# Patient Record
Sex: FEMALE | Race: WHITE | NOT HISPANIC OR LATINO | URBAN - METROPOLITAN AREA
[De-identification: names, ages, dates, MRNs, and addresses within clinical notes are randomized per-mention and may not be internally consistent; named-entity substitution may affect disease eponyms.]

---

## 2017-12-14 ENCOUNTER — RECORDS - HEALTHEAST (OUTPATIENT)
Dept: LAB | Facility: CLINIC | Age: 47
End: 2017-12-14

## 2017-12-14 LAB
CHOLEST SERPL-MCNC: 206 MG/DL
FASTING STATUS PATIENT QL REPORTED: ABNORMAL
HDLC SERPL-MCNC: 81 MG/DL
LDLC SERPL CALC-MCNC: 115 MG/DL
TRIGL SERPL-MCNC: 52 MG/DL

## 2019-01-23 ENCOUNTER — AMBULATORY - HEALTHEAST (OUTPATIENT)
Dept: NEUROLOGY | Facility: CLINIC | Age: 49
End: 2019-01-23

## 2019-01-23 DIAGNOSIS — S06.0XAA CONCUSSION: ICD-10-CM

## 2019-01-28 ENCOUNTER — COMMUNICATION - HEALTHEAST (OUTPATIENT)
Dept: NEUROLOGY | Facility: CLINIC | Age: 49
End: 2019-01-28

## 2019-02-12 ENCOUNTER — HOSPITAL ENCOUNTER (OUTPATIENT)
Dept: NEUROLOGY | Facility: CLINIC | Age: 49
Setting detail: THERAPIES SERIES
Discharge: STILL ADMITTED OR ADMINISTRATIVE | End: 2019-02-12
Attending: NURSE PRACTITIONER

## 2019-02-12 DIAGNOSIS — F06.4 ANXIETY DISORDER DUE TO MEDICAL CONDITION: ICD-10-CM

## 2019-02-12 DIAGNOSIS — R41.3 MEMORY LOSS: ICD-10-CM

## 2019-02-12 DIAGNOSIS — R53.83 FATIGUE, UNSPECIFIED TYPE: ICD-10-CM

## 2019-02-12 DIAGNOSIS — F07.81 POST CONCUSSION SYNDROME: ICD-10-CM

## 2019-02-12 LAB
ALBUMIN SERPL-MCNC: 4.3 G/DL (ref 3.5–5)
ALP SERPL-CCNC: 57 U/L (ref 45–120)
ALT SERPL W P-5'-P-CCNC: 12 U/L (ref 0–45)
ANION GAP SERPL CALCULATED.3IONS-SCNC: 12 MMOL/L (ref 5–18)
AST SERPL W P-5'-P-CCNC: 15 U/L (ref 0–40)
BASOPHILS # BLD AUTO: 0.1 THOU/UL (ref 0–0.2)
BASOPHILS NFR BLD AUTO: 1 % (ref 0–2)
BILIRUB SERPL-MCNC: 0.3 MG/DL (ref 0–1)
BUN SERPL-MCNC: 18 MG/DL (ref 8–22)
CALCIUM SERPL-MCNC: 10.1 MG/DL (ref 8.5–10.5)
CHLORIDE BLD-SCNC: 103 MMOL/L (ref 98–107)
CO2 SERPL-SCNC: 24 MMOL/L (ref 22–31)
CREAT SERPL-MCNC: 0.82 MG/DL (ref 0.6–1.1)
CRP SERPL HS-MCNC: 2.1 MG/L (ref 0–3)
EOSINOPHIL # BLD AUTO: 0.2 THOU/UL (ref 0–0.4)
EOSINOPHIL NFR BLD AUTO: 4 % (ref 0–6)
ERYTHROCYTE [DISTWIDTH] IN BLOOD BY AUTOMATED COUNT: 12.2 % (ref 11–14.5)
ERYTHROCYTE [SEDIMENTATION RATE] IN BLOOD BY WESTERGREN METHOD: 19 MM/HR (ref 0–20)
FOLATE SERPL-MCNC: 9.8 NG/ML
GFR SERPL CREATININE-BSD FRML MDRD: >60 ML/MIN/1.73M2
GLUCOSE BLD-MCNC: 80 MG/DL (ref 70–125)
HCT VFR BLD AUTO: 43.6 % (ref 35–47)
HGB BLD-MCNC: 14.6 G/DL (ref 12–16)
LYMPHOCYTES # BLD AUTO: 1.6 THOU/UL (ref 0.8–4.4)
LYMPHOCYTES NFR BLD AUTO: 27 % (ref 20–40)
MAGNESIUM SERPL-MCNC: 2.8 MG/DL (ref 1.8–2.6)
MCH RBC QN AUTO: 32.7 PG (ref 27–34)
MCHC RBC AUTO-ENTMCNC: 33.5 G/DL (ref 32–36)
MCV RBC AUTO: 98 FL (ref 80–100)
MONOCYTES # BLD AUTO: 0.4 THOU/UL (ref 0–0.9)
MONOCYTES NFR BLD AUTO: 8 % (ref 2–10)
NEUTROPHILS # BLD AUTO: 3.6 THOU/UL (ref 2–7.7)
NEUTROPHILS NFR BLD AUTO: 61 % (ref 50–70)
PLATELET # BLD AUTO: 253 THOU/UL (ref 140–440)
PMV BLD AUTO: 11.1 FL (ref 8.5–12.5)
POTASSIUM BLD-SCNC: 4 MMOL/L (ref 3.5–5)
PROT SERPL-MCNC: 8 G/DL (ref 6–8)
RBC # BLD AUTO: 4.46 MILL/UL (ref 3.8–5.4)
SODIUM SERPL-SCNC: 139 MMOL/L (ref 136–145)
TSH SERPL DL<=0.005 MIU/L-ACNC: 0.55 UIU/ML (ref 0.3–5)
VIT B12 SERPL-MCNC: 349 PG/ML (ref 213–816)
WBC: 5.9 THOU/UL (ref 4–11)

## 2019-02-13 ENCOUNTER — HOSPITAL ENCOUNTER (OUTPATIENT)
Dept: NEUROLOGY | Facility: CLINIC | Age: 49
Setting detail: THERAPIES SERIES
Discharge: STILL ADMITTED OR ADMINISTRATIVE | End: 2019-02-13
Attending: NURSE PRACTITIONER

## 2019-02-13 DIAGNOSIS — F43.23 ADJUSTMENT DISORDER WITH MIXED ANXIETY AND DEPRESSED MOOD: ICD-10-CM

## 2019-02-13 DIAGNOSIS — F07.81 POST CONCUSSION SYNDROME: ICD-10-CM

## 2019-02-13 LAB — 25(OH)D3 SERPL-MCNC: 30.8 NG/ML (ref 30–80)

## 2019-02-19 ENCOUNTER — HOSPITAL ENCOUNTER (OUTPATIENT)
Dept: OCCUPATIONAL THERAPY | Age: 49
Setting detail: THERAPIES SERIES
Discharge: STILL ADMITTED OR ADMINISTRATIVE | End: 2019-02-19
Attending: NURSE PRACTITIONER

## 2019-02-19 ENCOUNTER — HOSPITAL ENCOUNTER (OUTPATIENT)
Dept: PHYSICAL THERAPY | Age: 49
Setting detail: THERAPIES SERIES
Discharge: STILL ADMITTED OR ADMINISTRATIVE | End: 2019-02-19
Attending: NURSE PRACTITIONER

## 2019-02-19 DIAGNOSIS — R26.89 UNSTABLE BALANCE: ICD-10-CM

## 2019-02-19 DIAGNOSIS — M54.2 NECK PAIN: ICD-10-CM

## 2019-02-19 DIAGNOSIS — H53.9 VISION ABNORMALITIES: ICD-10-CM

## 2019-02-19 DIAGNOSIS — F07.81 POST CONCUSSION SYNDROME: ICD-10-CM

## 2019-02-21 ENCOUNTER — HOSPITAL ENCOUNTER (OUTPATIENT)
Dept: NEUROLOGY | Facility: CLINIC | Age: 49
Setting detail: THERAPIES SERIES
Discharge: STILL ADMITTED OR ADMINISTRATIVE | End: 2019-02-21
Attending: PSYCHOLOGIST

## 2019-02-21 DIAGNOSIS — F43.23 ADJUSTMENT DISORDER WITH MIXED ANXIETY AND DEPRESSED MOOD: ICD-10-CM

## 2019-02-26 ENCOUNTER — HOSPITAL ENCOUNTER (OUTPATIENT)
Dept: PHYSICAL THERAPY | Age: 49
Setting detail: THERAPIES SERIES
Discharge: STILL ADMITTED OR ADMINISTRATIVE | End: 2019-02-26
Attending: PHYSICAL THERAPIST

## 2019-02-26 DIAGNOSIS — R26.89 UNSTABLE BALANCE: ICD-10-CM

## 2019-02-26 DIAGNOSIS — M54.2 NECK PAIN: ICD-10-CM

## 2019-03-12 ENCOUNTER — HOSPITAL ENCOUNTER (OUTPATIENT)
Dept: PHYSICAL THERAPY | Age: 49
Setting detail: THERAPIES SERIES
Discharge: STILL ADMITTED OR ADMINISTRATIVE | End: 2019-03-12
Attending: PHYSICAL THERAPIST

## 2019-03-12 ENCOUNTER — HOSPITAL ENCOUNTER (OUTPATIENT)
Dept: NEUROLOGY | Facility: CLINIC | Age: 49
Setting detail: THERAPIES SERIES
Discharge: STILL ADMITTED OR ADMINISTRATIVE | End: 2019-03-12
Attending: NURSE PRACTITIONER

## 2019-03-12 DIAGNOSIS — G44.309 POST-CONCUSSION HEADACHE: ICD-10-CM

## 2019-03-12 DIAGNOSIS — F43.23 ADJUSTMENT DISORDER WITH MIXED ANXIETY AND DEPRESSED MOOD: ICD-10-CM

## 2019-03-12 DIAGNOSIS — R26.89 UNSTABLE BALANCE: ICD-10-CM

## 2019-03-12 DIAGNOSIS — F07.81 POST CONCUSSION SYNDROME: ICD-10-CM

## 2019-03-12 DIAGNOSIS — R79.89 LOW VITAMIN B12 LEVEL: ICD-10-CM

## 2019-03-12 DIAGNOSIS — F06.4 ANXIETY DISORDER DUE TO MEDICAL CONDITION: ICD-10-CM

## 2019-03-12 DIAGNOSIS — Z76.89 RETURN TO WORK EVALUATION: ICD-10-CM

## 2019-03-12 DIAGNOSIS — M54.2 NECK PAIN: ICD-10-CM

## 2019-03-19 ENCOUNTER — HOSPITAL ENCOUNTER (OUTPATIENT)
Dept: PHYSICAL THERAPY | Age: 49
Setting detail: THERAPIES SERIES
Discharge: STILL ADMITTED OR ADMINISTRATIVE | End: 2019-03-19
Attending: PHYSICAL THERAPIST

## 2019-03-19 DIAGNOSIS — M54.2 NECK PAIN: ICD-10-CM

## 2019-03-19 DIAGNOSIS — R26.89 UNSTABLE BALANCE: ICD-10-CM

## 2019-04-02 ENCOUNTER — HOSPITAL ENCOUNTER (OUTPATIENT)
Dept: PHYSICAL THERAPY | Age: 49
Setting detail: THERAPIES SERIES
Discharge: STILL ADMITTED OR ADMINISTRATIVE | End: 2019-04-02
Attending: PHYSICAL THERAPIST

## 2019-04-02 DIAGNOSIS — M54.2 NECK PAIN: ICD-10-CM

## 2019-04-02 DIAGNOSIS — R26.89 UNSTABLE BALANCE: ICD-10-CM

## 2019-04-09 ENCOUNTER — HOSPITAL ENCOUNTER (OUTPATIENT)
Dept: PHYSICAL THERAPY | Age: 49
Setting detail: THERAPIES SERIES
Discharge: STILL ADMITTED OR ADMINISTRATIVE | End: 2019-04-09
Attending: PHYSICAL THERAPIST

## 2019-04-09 DIAGNOSIS — M54.2 NECK PAIN: ICD-10-CM

## 2019-04-09 DIAGNOSIS — R26.89 UNSTABLE BALANCE: ICD-10-CM

## 2019-04-16 ENCOUNTER — HOSPITAL ENCOUNTER (OUTPATIENT)
Dept: PHYSICAL THERAPY | Age: 49
Setting detail: THERAPIES SERIES
Discharge: STILL ADMITTED OR ADMINISTRATIVE | End: 2019-04-16
Attending: PHYSICAL THERAPIST

## 2019-04-16 DIAGNOSIS — M54.2 NECK PAIN: ICD-10-CM

## 2019-04-16 DIAGNOSIS — R26.89 UNSTABLE BALANCE: ICD-10-CM

## 2019-04-30 ENCOUNTER — HOSPITAL ENCOUNTER (OUTPATIENT)
Dept: PHYSICAL THERAPY | Age: 49
Setting detail: THERAPIES SERIES
Discharge: STILL ADMITTED OR ADMINISTRATIVE | End: 2019-04-30
Attending: PHYSICAL THERAPIST

## 2019-04-30 DIAGNOSIS — R26.89 UNSTABLE BALANCE: ICD-10-CM

## 2019-04-30 DIAGNOSIS — M54.2 NECK PAIN: ICD-10-CM

## 2019-05-06 ENCOUNTER — HOSPITAL ENCOUNTER (OUTPATIENT)
Dept: NEUROLOGY | Facility: CLINIC | Age: 49
Setting detail: THERAPIES SERIES
Discharge: STILL ADMITTED OR ADMINISTRATIVE | End: 2019-05-06
Attending: NURSE PRACTITIONER

## 2019-05-06 DIAGNOSIS — M54.2 NECK PAIN: ICD-10-CM

## 2019-05-06 DIAGNOSIS — F07.81 POST CONCUSSION SYNDROME: ICD-10-CM

## 2019-05-06 DIAGNOSIS — G44.309 POST-CONCUSSION HEADACHE: ICD-10-CM

## 2019-05-06 DIAGNOSIS — F06.4 ANXIETY DISORDER DUE TO MEDICAL CONDITION: ICD-10-CM

## 2019-05-06 DIAGNOSIS — Z76.89 RETURN TO WORK EVALUATION: ICD-10-CM

## 2019-05-16 ENCOUNTER — AMBULATORY - HEALTHEAST (OUTPATIENT)
Dept: NEUROLOGY | Facility: CLINIC | Age: 49
End: 2019-05-16

## 2019-05-16 DIAGNOSIS — G44.309 POST-CONCUSSION HEADACHE: ICD-10-CM

## 2019-05-16 DIAGNOSIS — F07.81 POST CONCUSSION SYNDROME: ICD-10-CM

## 2019-10-16 ENCOUNTER — RECORDS - HEALTHEAST (OUTPATIENT)
Dept: LAB | Facility: CLINIC | Age: 49
End: 2019-10-16

## 2019-10-16 LAB
ANION GAP SERPL CALCULATED.3IONS-SCNC: 12 MMOL/L (ref 5–18)
BUN SERPL-MCNC: 20 MG/DL (ref 8–22)
CALCIUM SERPL-MCNC: 9.7 MG/DL (ref 8.5–10.5)
CHLORIDE BLD-SCNC: 101 MMOL/L (ref 98–107)
CO2 SERPL-SCNC: 26 MMOL/L (ref 22–31)
CREAT SERPL-MCNC: 0.89 MG/DL (ref 0.6–1.1)
GFR SERPL CREATININE-BSD FRML MDRD: >60 ML/MIN/1.73M2
GLUCOSE BLD-MCNC: 105 MG/DL (ref 70–125)
POTASSIUM BLD-SCNC: 3.9 MMOL/L (ref 3.5–5)
SODIUM SERPL-SCNC: 139 MMOL/L (ref 136–145)

## 2020-01-07 ASSESSMENT — MIFFLIN-ST. JEOR: SCORE: 1111.66

## 2020-01-08 ENCOUNTER — ANESTHESIA - HEALTHEAST (OUTPATIENT)
Dept: SURGERY | Facility: CLINIC | Age: 50
End: 2020-01-08

## 2020-01-08 ENCOUNTER — SURGERY - HEALTHEAST (OUTPATIENT)
Dept: SURGERY | Facility: CLINIC | Age: 50
End: 2020-01-08

## 2020-01-15 ENCOUNTER — COMMUNICATION - HEALTHEAST (OUTPATIENT)
Dept: SCHEDULING | Facility: CLINIC | Age: 50
End: 2020-01-15

## 2021-05-24 ENCOUNTER — RECORDS - HEALTHEAST (OUTPATIENT)
Dept: ADMINISTRATIVE | Facility: CLINIC | Age: 51
End: 2021-05-24

## 2021-05-27 NOTE — PROGRESS NOTES
Physical Therapy  Physical Therapy Daily Outpatient Documentation    Date: 4/16/2019    Rx Units: 1- TE, 1- NR, 1- MT  Total OP Minutes: 45   Treatment #: 6/10 - AUTO    Pain: 0/10, 5/10  Location: headache, neck stiffness  Intervention: see below    Subjective: Patient notes that she has taken some time off work since last week, however continues to have symptoms.  She has had increased stress in her life and is uncertain if this is causing her recent headaches the past two days.  She has been trying to do some word games on her phone to help her brain focus.  She reports decreased motivation today, however has been compliant with her HEP.  She notes stiffness left sided neck.       Therapeutic Exercise  Total Minutes: 15  The following stretching and strengthening to address pt's posture and muscle imbalance leading to neck pain/stiffness:  -Seated L levator scap stretch x 30 seconds x 2 reps - VC for proper technique  -Seated L upper trap x 30 seconds x 2 reps - minor VC for proper hand placement  -Corner pec major stretch x 30 seconds - VC and TC for proper form  -Corner pec minor stretch x 30 seconds - VC and TC for proper technique  -Standing scap set with arm extension using orange theraband x 15 reps - VC and TC for increased scapular depression, relax neck and jaw muscles   -Pt educated on use of guided meditation to assist with muscle tone management and in turn neck pain.     -Cervical AROM pre treatment:   Right rotation: 60 degrees   Left rotation: 72 degrees        Neuro Re-Ed/Balance  Total Minutes: 15    mCTSIB:   Firm ground, eyes open: >30 seconds   Firm ground, eyes closed: >30 seconds   Foam, eyes open: >30 seconds   Foam, eyes closed: >30 seconds    Functional Gait Assessment:  Total Score: 27/30   Gait Level surface: 3 - Normal 5.39 seconds   Change in Gait Speed: 3 - Normal   Gait with horizontal head turns: 3 - Normal   Gait with vertical head turns: 2 - Mild impairment: slight change in  speed   Gait and Pivot Turn: 3 - Normal   Step Over Obstacle: 3 - Normal   Gait with Narrow Base of Support: 1- Moderate impairment: 3 steps   Gait with Eyes Closed: 3 - Normal 6.41 seconds   Ambulating Backwards: 3 - Normal 6.38 seconds   Steps: 3 - Normal        Manual Therapy  Total Minutes: 15  -Cervical distraction x 1 minute x 2 reps  -Soft tissue manipulation to L upper traps with trigger point release x 45 seconds x 4 reps on L, L levator scapula (L>R) with 2 trigger point release x 30 seconds, L cervical paraspinals  -Pt instructed in and trial of Theracane to address levator scap and L upper trap trigger point. X 30 seconds each. Therapist assisted pt locate point and VC for proper use of theracane.         Current HEP:  -chin tucks  -UT stretch  -levator stretch  -TA contraction  -TA with leg lift  -scalene stretch  -pec major and minor stretch  -scap sets with orange theraband  -wall push ups  -weight shifting    Assessment/Plan for week of 4/15/2019-4/19/2019:  Patient continues to demonstrate improvements in her balance and cervical ROM.  She is now have to complete all conditions of the mCTSIB for >30 seconds without LOB and scored low fall risk on the Functional Gait Assessment (27/30).  Her balance appears to have returned to baseline and pt notes not balance complaints in her daily activities.  She continues to have left sided neck stiffness which affects her cervical rotation to the right.  Her ROM readings show maintenance from last session, however pt continues to have trigger points.  The additional stress reported in her life may be playing a role in her ongoing muscle tension and neck pain. Plan to continue addressing muscle imbalance of the cervical and scapular region which is most likely contributing to her ongoing neck pain.  Current POC and goals remain appropriate.  Plan to progress HEP to increase self management of symptoms.         Additional Notes:  PT goals:  Pt will...  1.  Demonstrate cervical rotation >60 degrees B to check blind spots - progressing.  2. Score >30 seconds on all conditions of the mCTSIB - MET  3. Score >20/30 on the Functional Gait Assessment to reduce fall  - MET.   4. Be independent with a HEP to self manage symptoms  - progressing

## 2021-05-27 NOTE — PROGRESS NOTES
Physical Therapy  Physical Therapy Daily Outpatient Documentation        Rx Units: 1- TE, 1- NR, 2- MT  Total OP Minutes: 50    Treatment #: 4/10 - AUTO    Pain: 5/10, 6/10  Location: headache, neck pain  Intervention: see below    Subjective:Patient reports that she has been getting headaches when she becomes stressed. She continues to have constant neck pain.  She has been able to work over the weekend, however was unable to do much activity due to dental work last week.  Pt notes compliance to her HEP.       Therapeutic Exercise  Total Minutes: 15  - Supine chin tucks with towel roll x 6 reps with 15 second isometric holds - VC for small movement to isolate DNF  - Bridging x 15 reps with 3 sec holds - VC to relax shoulders and cues to level pelvis.   - Side lying clams with orange theraband x 15 reps each side - VC and TC to avoid rolling backwards and keep feet together. Added to HEP. All questions addressed.   -Cervical AROM post treatment:   Flexion; 50 degrees   Extension: 50 degrees   Left side bendin degrees   Right side bendin degrees   Right rotation: 35 degrees   Left rotation: 60 degrees-        Neuro Re-Ed/Balance  Total Minutes: 10  -Obstacle course of 30' of foam balance beam, over hurdles and tapping then stepping over cones (6) x 16 reps - close SBA.  Pt completed 4 reps as is with minor VC for TA engagement. Intermittent balance checks for balance.  6 reps with pt carrying tray - no LOB or stumbling when carrying tray.  Added 3 cones to balance on tray and conversation or listing menu items x 6 reps - intermittent side step to recover mild balance impairments. No overt LOB observed.          Manual Therapy  Total Minutes: 25  -Cervical distraction x 1 minute x 3 reps   -Suboccipital release x 1 minute  -Soft tissue manipulation to B upper traps, B levator scapula, gentle B SCM and B cervical paraspinals. Left greater than right throughout  Trigger point release x 45 seconds x 4 reps to L  upper trap, x 45 seconds x 3 reps to L levator scapula  -Manual stretch to L upper trap and L levator scap for 30 seconds x 2 reps each  -Unable to assess cervical PIVM d/t hypertonicity        Current HEP:  -chin tucks  -UT stretch  -levator stretch  -TA contraction  -TA with leg lift    Assessment/Plan for week of 4/1/2019-4/5/2019:  Patient presents with significant hypertonicity of left cervical/scapular muscles which appeared to respond well to manual techniques and is playing a role in her reduce cervical AROM. She is showing improvement in her proximal mm strengthen as her form has been improving and she was able to tolerate additional exercises.  Patient's balance continues to improve as well and she did not experience any overt LOB during multi-tasking balance activities.  Patient continues to benefit from skilled PT to address her reduce cervical ROM and cervical segmental mobility will be addressed next session. Additional further static balance activities on a variety of surfaces will be reassessed.       Additional Notes:  PT goals:  Pt will...  1. Demonstrate cervical rotation >60 degrees B to check blind spots.  2. Score >30 seconds on all conditions of the mCTSIB  3. Score >20/30 on the Functional Gait Assessment to reduce fall risk.   4. Be independent with a HEP to self manage symptoms

## 2021-05-27 NOTE — PROGRESS NOTES
"Physical Therapy  Physical Therapy Daily Outpatient Documentation        Rx Units: 2 - MT, 1- TE, 1- NR  Total OP Minutes: 50   Treatment #: 5/10 - AUTO    Pain: 0/10, 5/10  Location: headache, neck pain  Intervention: see below    Subjective:Patient notes that she worked three double shifts in a row and reports that she \"was beat up\" with all the work.  She reports increased stress from work and continues with left sided soreness.  She feels like her memory is returning and she is able to do more things at work. \"Things are clicking\"      Therapeutic Exercise  Total Minutes: 15  -Seated L scalene/SCM stretch x 30 seconds x 2 reps - VC and TC for proper form and technique  -Seated scap retraction/depression x 15 reps  - VC and TC for increased scap depression  -Corner pec major stretch x 30 seconds - VC for proper form  -Corner pec minor stretch x 30 seconds - VC for technique and optimizing stretch  -Wall push ups with focus on scap set x 10 reps - VC for scap set and arm position as well as abdominal engagement.   -Standing scap retraction/depression with arm flexion to 90 degrees while holding 2# on R and 1.1# on L x 10 reps each side. Mild catch on L when attempted with 2# weight therefore reduced weight.      -Cervical AROM post treatment:   Left side bendin degrees   Right side bendin degrees   Right rotation: 65 degrees   Left rotation: 70 degrees        Neuro Re-Ed/Balance  Total Minutes: 10  Balance activities:   Romberg on foam:    Eyes open x 60 seconds - VC for abdominal and glut engagement as well as breathing techniques    Eyes closed x 10 seconds - moderate to severe UB sway and arms in high guard   Normal DAVE on foam:    Eyes closed x 25 seconds - mild to moderate UB sway. VC for glut and abdominal engagement   Romberg on firm ground:    Eyes open x 30 seconds - no LOB    Eyes closed x 30 seconds - mild to moderate sway. VC to avoid locking knees   A/P weight shifting with feet in normal " DAVE x 60 seconds - VC to decreased UE Support as able   Lateral weight shifting in narrow DAVE x 60 seconds - VC for increased weight shifting as able without minimal to no UE support        Manual Therapy  Total Minutes: 25  -Cervical distraction x 1 minute x 2 reps  -Soft tissue manipulation to B upper traps (L>R) with trigger point release x 45 seconds x 4 reps on L, B levator scapula (L>R) with 2 trigger point release x 30 seconds each, B cervical paraspinals L>R  -Manual stretch to B upper traps and L levator scapula x 30 second each.  -Supine cervical PIVM C3-7 with oscillations grade II on L throughout.  -Cervical rotation, flexion test: restricted to right indicating left rib issues.   -Seated L first rib MET x 4 reps with 5 sec holds - increased ROM post.         Current HEP:  -chin tucks  -UT stretch  -levator stretch  -TA contraction  -TA with leg lift  -scalene stretch  -pec major and minor stretch  -scap sets  -wall push ups  -weight shifting    Assessment/Plan for week of 4/8/2019-4/12/2019:  Patient continues to present with neck pain, however headache symptoms have improved. She demonstrated improved ROM post manual techniques and addressing scalenes and first rib issues on the left which increased right cervical rotation.  It appears that cervical and scapular muscle imbalance is playing a role in her ongoing neck pain and ROM issues. Additional focus on appropriate stretching and postural strengthening was provided and added to HEP.  Plan to continue addressing postural strengthening and mm imbalance as well as balance strategies in order to progress towards established goals.         Additional Notes:  PT goals:  Pt will...  1. Demonstrate cervical rotation >60 degrees B to check blind spots.  2. Score >30 seconds on all conditions of the mCTSIB  3. Score >20/30 on the Functional Gait Assessment to reduce fall risk.   4. Be independent with a HEP to self manage symptoms

## 2021-05-28 ENCOUNTER — RECORDS - HEALTHEAST (OUTPATIENT)
Dept: ADMINISTRATIVE | Facility: CLINIC | Age: 51
End: 2021-05-28

## 2021-05-28 NOTE — PROGRESS NOTES
.  Patient's impression of how medication is working? Pt d/c'd medication as she wasn't feeling herself    Compliant with Medication? Didn't feel like herself on her medications    Side Effects: Other    Current Symptoms : No    Pain (0-10) mild left neck pain  Appetite change Yes  Sleep disturbance No  Change in energy Yes  Change in interest Yes  Change in concentration Yes  Psychosis/Hallucinations No  Negative thoughts No  Mood swings No  Alcohol use No  Drug use No  Anxiety low  Sad/depressed mood low
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
## 2021-05-31 ENCOUNTER — RECORDS - HEALTHEAST (OUTPATIENT)
Dept: ADMINISTRATIVE | Facility: CLINIC | Age: 51
End: 2021-05-31

## 2021-06-03 VITALS — WEIGHT: 119.2 LBS | BODY MASS INDEX: 20.46 KG/M2

## 2021-06-04 VITALS — HEIGHT: 64 IN | BODY MASS INDEX: 19.26 KG/M2 | WEIGHT: 112.8 LBS

## 2021-06-05 NOTE — TELEPHONE ENCOUNTER
Patient called Daughter Sachi answered.  DTR reports she is  finely eating, taking fluids.  Dr malagon 1/22/2020.    Carol Martin RN    [facility] Triage/med refill  1/20/2020  1:16 PM

## 2021-06-05 NOTE — ANESTHESIA PREPROCEDURE EVALUATION
Anesthesia Evaluation      Patient summary reviewed   No history of anesthetic complications     Airway   Mallampati: II  Neck ROM: full   Pulmonary - negative ROS and normal exam                          Cardiovascular - negative ROS and normal exam   Neuro/Psych - negative ROS     Endo/Other - negative ROS      GI/Hepatic/Renal - negative ROS           Dental    (+) poor dentition                       Anesthesia Plan  Planned anesthetic: spinal and peripheral nerve block    ASA 2     Anesthetic plan and risks discussed with: patient

## 2021-06-05 NOTE — ANESTHESIA CARE TRANSFER NOTE
Last vitals:   Vitals:    01/08/20 1350   BP: 130/87   Pulse: 83   Resp: 10   Temp: 36.3  C (97.4  F)   SpO2: 100%     Patient's level of consciousness is awake  Spontaneous respirations: yes  Maintains airway independently: yes  Dentition unchanged: yes  Oropharynx: oropharynx clear of all foreign objects    QCDR Measures:  ASA# 20 - Surgical Safety Checklist: WHO surgical safety checklist completed prior to induction    PQRS# 430 - Adult PONV Prevention: 4558F - Pt received => 2 anti-emetic agents (different classes) preop & intraop  ASA# 8 - Peds PONV Prevention: 4558F - Pt received => 2 anti-emetic agents (different classes) preop & intraop  PQRS# 424 - Jeannie-op Temp Management: 4559F - At least one body temp DOCUMENTED => 35.5C or 95.9F within required timeframe  PQRS# 426 - PACU Transfer Protocol: - Transfer of care checklist used  ASA# 14 - Acute Post-op Pain: ASA14B - Patient did NOT experience pain >= 7 out of 10

## 2021-06-05 NOTE — ANESTHESIA PROCEDURE NOTES
Spinal Block    Patient location during procedure: OR  Start time: 1/8/2020 11:15 AM  End time: 1/8/2020 11:18 AM  Reason for block: primary anesthetic    Staffing:  Performing  Anesthesiologist: Jared Ayers IV, MD    Preanesthetic Checklist  Completed: patient identified, risks, benefits, and alternatives discussed, timeout performed, consent obtained, at patient's request, airway assessed, oxygen available, suction available, emergency drugs available and hand hygiene performed  Spinal Block  Patient position: right lateral decubitus  Prep: ChloraPrep  Patient monitoring: heart rate, cardiac monitor, continuous pulse ox and blood pressure  Approach: midline  Location: L3-4  Injection technique: single-shot  Needle type: pencil-tip   Needle gauge: 24 G

## 2021-06-05 NOTE — TELEPHONE ENCOUNTER
RN Assessment/Reason for Call:   Okay to leave Detailed Message  DTR calling in, on her list at [facility]; verbal permission given to speak to her DTR  2 days hasn't eaten or drank anything  Can't hold anything down.  Knee surgery last week. On med's.  Pale, disoriented.  Urinated x 3/ + diarrhea.  Took a stool a stool softener; not passing gas.  Changed pain pills.    RN Action/Disposition:  Protocol recommends home care  Offered [facility]..  Call back if worse symptoms  Discussed home care measures.  Agrees to plan.     Carol Martin, LISA    [facility] Triage/med refill  1/15/2020  6:31 PM        Reason for Disposition    MILD or MODERATE vomiting (e.g., 1 - 5 times / day)    Protocols used: VOMITING-A-AH

## 2021-06-05 NOTE — ANESTHESIA POSTPROCEDURE EVALUATION
Patient: Liz Johnson  OPEN REDUCTION INTERNAL FIXATION FRACTURE RIGHT TIBIAL PLATEAU, MENISCUS REPAIR, BONE GRAFTING  Anesthesia type: spinal    Patient location: PACU  Last vitals:   Vitals Value Taken Time   /76 1/8/2020  4:20 PM   Temp 36.3  C (97.3  F) 1/8/2020  4:20 PM   Pulse 92 1/8/2020  4:20 PM   Resp 18 1/8/2020  4:20 PM   SpO2 100 % 1/8/2020  4:20 PM     Post vital signs: stable  Level of consciousness: awake and responds to simple questions  Post-anesthesia pain: pain controlled  Post-anesthesia nausea and vomiting: no  Pulmonary: unassisted, spontaneous ventilation, nasal cannula  Cardiovascular: stable and blood pressure at baseline  Hydration: adequate  Anesthetic events: no    QCDR Measures:  ASA# 11 - Jeannie-op Cardiac Arrest: ASA11B - Patient did NOT experience unanticipated cardiac arrest  ASA# 12 - Jeannie-op Mortality Rate: ASA12B - Patient did NOT die  ASA# 13 - PACU Re-Intubation Rate: ASA13B - Patient did NOT require a new airway mgmt  ASA# 10 - Composite Anes Safety: ASA10A - No serious adverse event    Additional Notes: difficult pain control, added Fentanyl, Dilaudid, and Magnesium with moderate success; patient left PACU in good condition, resting comfortably

## 2021-06-05 NOTE — ANESTHESIA PROCEDURE NOTES
Peripheral Block    Patient location during procedure: pre-op  Start time: 1/8/2020 11:19 AM  End time: 1/8/2020 11:20 AM  post-op analgesia per surgeon order as noted in medical record  Staffing:  Performing  Anesthesiologist: Jared Ayers IV, MD  Preanesthetic Checklist  Completed: patient identified, site marked, risks, benefits, and alternatives discussed, timeout performed, consent obtained, at patient's request, airway assessed, oxygen available, suction available, emergency drugs available and hand hygiene performed  Peripheral Block  Block type: saphenous, adductor canal block  Prep: ChloraPrep  Patient position: supine  Patient monitoring: cardiac monitor, continuous pulse oximetry, heart rate and blood pressure  Laterality: right  Injection technique: ultrasound guided    Ultrasound used to visualize needle placement in proximity to nerve being blocked: yes (vessesl visualized)   US used to visualize anesthetic spread  Visualized anatomic structures normal  No Pathological Findings  Permanent ultrasound image captured for medical record  Sterile gel and probe cover used for ultrasound.  Needle  Needle type: Stimuplex   Needle gauge: 20G  Needle length: 4 in  no peripheral nerve catheter placed  Assessment  Injection assessment: negative aspiration for heme, no paresthesia on injection, incremental injection and no difficulty with injection

## 2021-06-10 ENCOUNTER — RECORDS - HEALTHEAST (OUTPATIENT)
Dept: SURGERY | Facility: CLINIC | Age: 51
End: 2021-06-10

## 2021-06-16 PROBLEM — F43.23 ADJUSTMENT DISORDER WITH MIXED ANXIETY AND DEPRESSED MOOD: Status: ACTIVE | Noted: 2019-02-13

## 2021-06-16 PROBLEM — S82.143A TIBIAL PLATEAU FRACTURE, UNSPECIFIED LATERALITY, CLOSED, INITIAL ENCOUNTER: Status: ACTIVE | Noted: 2020-01-07

## 2021-06-18 NOTE — LETTER
Letter by Rachel Bolanos FNP at      Author: Rachel Bolanos FNP Service: -- Author Type: --    Filed:  Date of Service:  Status: (Other)       March 12, 2019     Patient: Liz Johnson   YOB: 1970   Date of Visit: 3/12/2019       To Whom It May Concern:    It is my medical opinion that Liz Johnson may return to work on 3/12/19. Employees recovering from concussions often exhibit cognitive symptoms that make attending work and learning difficult. They may not be able to attend work or only partial days. Some symptoms that could affect performance in the work environment  include: sensitivity to light and noise, headache, trouble focusing, concentrating, or remembering, and difficulty looking at a screen. The accommodations below often help reduce the symptoms and allow them to return to work quicker. Compliance with these accommodations allows the brain to recover more quickly even if it appears the employee is symptom free.     Attendance Restrictions  The patient should not work over 30 hours a week    Other Accommodations:  1. Allow patient to take a break if she starts to have symptoms.    If you have any questions or concerns, please don't hesitate to call.    Sincerely,        Electronically signed by SOFI Grey

## 2021-06-18 NOTE — LETTER
Letter by Rachel Bolanos FNP at      Author: Rachel Bolanos FNP Service: -- Author Type: --    Filed:  Date of Service:  Status: (Other)       March 12, 2019     Patient: Liz Johnson   YOB: 1970   Date of Visit: 3/12/2019       To Whom It May Concern:    It is my medical opinion that Liz Johnson may return to work on 3/12/19.   Employees recovering from concussions often exhibit cognitive symptoms that make attending work and learning difficult. They may not be able to attend work or only partial days. Some symptoms that could affect performance in the work environment  include: sensitivity to light and noise, headache, trouble focusing, concentrating, or remembering, and difficulty looking at a screen. The accommodations below often help reduce the symptoms and allow them to return to work quicker. Compliance with these accommodations allows the brain to recover more quickly even if it appears the employee is symptom free.     Attendance Restrictions  The patient should not work over 30 hours a week for the next four weeks. She will return to clinic in four weeks and I will reassess for return to work.     Other Accommodations:  1. Allow patient to take a break if she starts to have symptoms.      If you have any questions or concerns, please don't hesitate to call.    Sincerely,        Electronically signed by SOFI Grey

## 2021-06-18 NOTE — LETTER
Letter by Mariah Prasad at      Author: Mariah Prasad Service: -- Author Type: --    Filed:  Encounter Date: 1/28/2019 Status: (Other)       [facility]  [address] [place] [address]             Dear Liz:    This is a reminder about your upcoming appointment at [facility].  You are scheduled with Rachel Veliz on February 12th at 8:45am.    Enclosed may be forms to be filled out specific to your appointment with us, along with added information that may be helpful, such as a map, brochures regarding your appointment, fliers for support groups or activities, also information on Passport discounts, which includes reduced parking.  Please bring completed forms, insurance cards, photo ID and a current list of medications with you to your appointment.  It will also be helpful to wear glasses and/or hearing aids, if prescribed, as there are more forms to be signed at check-in.    To prepare for your appointment, please obtain prior records from other facilities such as scans on disc, consult reports and/or testing.  This will help our providers ensure a more accurate approach to serve your, or your loved one's needs.  It may be helpful to have family and/or friends to accompany you to your appointment, if possible, given the amount of new information you may receive.    Our clinic is located inside of [facility] on the [facility].  You can park in our parking ramp that is attached to the hospital by the skyway.  Take the skyway across to the hospital.  You will take the first set of elevators down to the [facility], take a right out of the elevators and down the chong, on the left, you will see our waiting room and the check-in desk.  You will check-in at that desk.  You can also  a parking ticket there and ask if you qualify for a Passport parking discount ticket.     If you have any questions or concerns, please do not hesitate to call.    Sincerely,    The staff at  [facility]          AxOx3    NSR /    MTI healed sternum stable    Lungs CTA on room air    Ab soft nontender  + BM    Voids    equal strength throughout     B/l LE warm well perfused + DP     133/91 104 18 99 37.8                             9.5      7.44  )-----------( 122      ( 19 Jun 2020 07:38 )               28.6     06-19        138  |  104  |  29<H>    ----------------------------<  91    4.4   |  23  |  1.21        Ca    9.0      19 Jun 2020 07:38        TPro  6.0  /  Alb  3.9  /  TBili  1.1  /  DBili  x   /  AST  24  /  ALT  31  /  AlkPhos  79  06-19 AxOx3    NSR /    MTI healed sternum stable    Lungs CTA on room air    Ab soft nontender  + BM    Voids    equal strength throughout     B/l LE warm well perfused + DP     133/91 104 18 99 37.8                             9.5      7.44  )-----------( 122      ( 19 Jun 2020 07:38 )               28.6     06-19        138  |  104  |  29<H>    ----------------------------<  91    4.4   |  23  |  1.21        Ca    9.0      19 Jun 2020 07:38        TPro  6.0  /  Alb  3.9  /  TBili  1.1  /  DBili  x   /  AST  24  /  ALT  31  /  AlkPhos  79  06-19        Greater then 45 minutes spent on discharge

## 2021-06-20 ENCOUNTER — HEALTH MAINTENANCE LETTER (OUTPATIENT)
Age: 51
End: 2021-06-20

## 2021-06-24 NOTE — PROGRESS NOTES
PT. Is here for a follow up appt. And is doing much better but still having headaches here and there.
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
## 2021-06-24 NOTE — PROGRESS NOTES
Physical Therapy  Physical Therapy Daily Outpatient Documentation        Rx Units: 2- TE, 1- NR, 1- MT    Total OP Minutes: 55    Treatment #: 1/10 - AUTO    Pain: 6/10, 5/10  Location: headache, neck pain  Intervention: see below    Subjective: Patient got a neck massager with heat.  She notes that she is able to move her neck easier, however she feels like she has an issue in her left shoulder region.  Patient had a headache yesterday after work and she has been taking ibuprofen for management    Therapeutic Exercise  Total Minutes: 25  -NuStep x 8 minutes  Level #3 Ave METS: 1.8  SPM: 78 Therapist assisted with set up.   - Supine chin tucks with towel roll 2 x 10 reps with 5 second isometric holds - VC for 50% contraction and tongue to roof of mouth to facilitate DNFs, Trial on pillow for support which reduced reports of L sided neck pain.   - Hook lying TA contraction x 10 reps - VC for breathing technique and drawing abdominals in  - Hook lying TA contraction with alternating LE lift x 4 reps x 4 sets - VC for coordination and breathing. Improved coordination as sets progressed  - Seated upper trap stretch x 30 seconds B - VC for proper form and technique  - Seated levator scapula stretch x  30  Seconds B - minor VC and TC for proper technique  - Seated scapular retraction with depression x 10 reps with 5 sec holds - TC for increased scapular depression       Neuro Re-Ed/Balance  Total Minutes: 15  Balance activities:   Normal DAVE on foam with close SBA:    Eyes open x 60 seconds - VC for anterior weight shift, avoid locking knees and slightly forward shoulders    Horizontal head turns x 10 reps - VC for breathing and maintain anterior weight shift    Ball toss to therapist x 2 minutes - improved balance and ability to reach out of DAVE for ball. Occasional hip strategies observed    Vertical head turns x 10 reps - VC for slightly flexed knees    Squats x 10 reps - VC to sit back into squat and move arms into  forward flexion to counter balance    PNF D1/D2 pattern with 4.4# med ball x 10 reps each direction - VC for increased trunk rotation and minimal TC for proper form. Increased trunk rotation when rotating to right  Pt educated on benefits of use of balance training on foam for increased ankle strategies as well as need to consistently adjust and react to change in surface vs staying rigid. All questions addressed.       Manual Therapy  Total Minutes: 15  -Cervical distraction x 1 minute x 3 reps  -Suboccipital release x 1 minute x 3 reps - reduction of headache reported  -Soft tissue manipulation to B upper traps (L>R with trigger point release x 45 seconds x 4 reps on L UT), B levator scapula with trigger point release to 1 location on L x 40 seconds, cervical paraspinals and R SCM    Current HEP:  -chin tucks  -UT stretch  -levator stretch  -TA contraction  -TA with leg lift    Assessment/Plan for week of 2/25/2019-3/1/2019:  Patient returns for first subsequent treatment session after initial evaluation. Patient was able to tolerate increased activity and ROM this date compared to initial evaluation. She appears to have decreased body awareness and balance strategies especially when she is fearful of falling or on unstable surfaces.  With additional education and cues, she was able to improve her balance. Patient responded well to manual techniques and had reduced headache post session.  There appears to be a number of external stressors that may be playing a role in her symptoms.  Plan to continue addressing muscle balance, body awareness, balance strategies and posture to progress towards established goals.  Continue POC.    Additional Notes:  PT goals:  Pt will...  1. Demonstrate cervical rotation >60 degrees B to check blind spots.  2. Score >30 seconds on all conditions of the mCTSIB  3. Score >20/30 on the Functional Gait Assessment to reduce fall risk.   4. Be independent with a HEP to self manage symptoms

## 2021-06-24 NOTE — PROGRESS NOTES
"Physical Therapy  Physical Therapy Daily Outpatient Documentation        Rx Units: 2 - TE, 1- NR   Total OP Minutes: 50   Treatment #: 2/10 - AUTO    Pain: 0/10, 6/10  Location: headache, neck pain  Intervention: see below    Subjective:Patient notes that she was trying to push herself at work and \"working extra.\" She ended up working 2-3 days in a row for 6 hour shifts.  Her headaches have been more frequent, however is uncertain if the weather change is to blame. She continues to be using her neck massager which has been helping.     Therapeutic Exercise  Total Minutes: 35  Treadmill x 8 minutes    -2.2 mph x 3 minutes   -2.5 mph x 5 minutes - single UE support. VC for upright posture as pt tends to fall forward into treadmill.   -Seated upper trap stretch x 30 seconds x 3 reps B - VC and TC to relax shoulders  -Seated levator scapula stretch x 30 seconds x 2 reps B - VC and TC for proper form  - Hook lying TA contraction x 30 reps - good form  - Hook lying TA contraction with alternating LE lift x 10 reps x 2 sets - minor VC to maintain TA contraction and keep shoulders relax.   - Hook lying TA contraction with alt UE/LE lift x 10 reps - VC for coordination  - Bridging x 10 reps with 3 sec holds - VC to relax shoulders and cues to level pelvis.   - Supine chin tucks with pillow x 10 reps with 8 second isometric holds - VC for 50% contraction and tongue to roof of mouth to facilitate DNFs as well as small movement.   -  Issued updated written HEP and all questions addressed.       Neuro Re-Ed/Balance  Total Minutes: 15  Dynamic Balance Activities with close SBA:   Walking with speed changes x 70 feet x 2 reps - no path deviations   Walking with horizontal head turns x 60 feet x 3 reps - progressed from L/center/R to L/R.  Mild path deviations oberved   Walking with vertical head turns x 60 feet x 2 reps - increased pace when looking down   Walking backwards x 30 feet x 2 reps - VC for increased step length and " reassurance   Walking with 180 degree turns x 6 reps - VC for abdominal engagement   Walking with eyes closed x 20 feet x 2 reps - VC for abdominal engagement. Slight deviation     Obstacle course x 30 feet of stepping over obstacle, around cones and on foam.  X 10 reps with SBA assistance. VC for increased step height. Added pt carrying a tray and 3 cones to mimic work environment. Slightly slower pace when balancing objects on tray.  No LOB.         Manual Therapy  Total Minutes:     Current HEP:  -chin tucks  -UT stretch  -levator stretch  -TA contraction  -TA with leg lift    Assessment/Plan for week of 3/11/2019-3/15/2019:  Patient has been able to tolerate increased activity with lower pain ratings this date. She appears to have decreased awareness of appropriate balance reactions and postural awareness while on the treadmill.  As a result, abdominal stabilization program was added to help with body awareness and carryover to balance activities.  Plan to continue addressing high level balance activities, multi-tasking and postural stabilization progression. Will continue current POC.       Additional Notes:  PT goals:  Pt will...  1. Demonstrate cervical rotation >60 degrees B to check blind spots.  2. Score >30 seconds on all conditions of the mCTSIB  3. Score >20/30 on the Functional Gait Assessment to reduce fall risk.   4. Be independent with a HEP to self manage symptoms

## 2021-06-24 NOTE — PATIENT INSTRUCTIONS - HE
[email or web address] get two pairs of blue tinted lenses, one light tint and one dark tint, light for when inside and at work, dark for when it snows or is bright out
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
## 2021-06-25 NOTE — PROGRESS NOTES
"Physical Therapy  Physical Therapy Daily Outpatient Documentation        Rx Units: 1- TE, 1- NR, 1- MT   Total OP Minutes: 50   Treatment #: 3/10 - AUTO    Pain: 0/10, 6/10  Location: headache, neck pain  Intervention: see below    Subjective:Patient reports that she pushed herself this last weekend at work. She was doing a lot of multitasking which resulted in increased head pressure.  She has been sleeping well and reports compliance to her HEP on a daily basis.  She continues to have left sided neck stiffness.    Therapeutic Exercise  Total Minutes: 15  - Hook lying TA contraction with alt UE/LE lift x 10 reps - VC for coordination  - Bridging x 10 reps with 3 sec holds - VC to relax shoulders and cues to level pelvis.   - Supine chin tucks with pillow x 10 reps with 10 second isometric holds - VC for small movements  - Pt educated on relaxation techniques and recommended use during her work breaks.          Neuro Re-Ed/Balance  Total Minutes: 20    Obstacle course x 35 feet of weaving around cones, stepping on river rocks, foam balance beam and stepping over hurdles at 8\" and 12\"  X 14 reps - CGA to close SBA. Pt with excessive arm movements for balance and UB sway.  VC for slightly faster pace to better control balance.  Added carrying a tray x 4 reps - improved balance with tray.  Added tray and 3 cones balancing on tray x 4 reps - continued with improved balance. Only knocked over cone twice during activity.  Pt requires reassurance for balance confidence.     Dynamic balance activities:   Walking backwards x 25 feet x 2 reps - VC for increased step length. SBA   [place] L and R x 25' each direction - close SBA. VC and demonstration for proper technique         Manual Therapy  Total Minutes: 15  Manual cervical distraction x 1 min 2 reps, pt finds relief with this.   STM to upper traps/levator mm, and scalenes on L   Trigger point release to L upper traps x 45 seconds x 3 reps  Passive L upper trap stretch " x 30 sec x 2 reps     Current HEP:  -chin tucks  -UT stretch  -levator stretch  -TA contraction  -TA with leg lift    Assessment/Plan for week of 3/18/2019-3/22/2019:  Patient presents with improved headache management this date, however continues to have neck pain.   She continues to benefit from postural awareness exercises and abdominal stabilization in order to avoid overuse of cervical muscles for stabilization.  Patient demonstrated increased postural sway during dynamic balance activities, however with the addition of carrying something (ie tray) her balance greatly improved.   She may be distracted with use of a second task during balance activities and then does not over think basic balance strategies.  Plan to focus on balance activities on various surfaces static and dynamic and reassess cervical AROM next session.       Additional Notes:  PT goals:  Pt will...  1. Demonstrate cervical rotation >60 degrees B to check blind spots.  2. Score >30 seconds on all conditions of the mCTSIB  3. Score >20/30 on the Functional Gait Assessment to reduce fall risk.   4. Be independent with a HEP to self manage symptoms

## 2021-07-03 NOTE — ADDENDUM NOTE
Addendum Note by Vanessa Langford CMA at 3/12/2019 11:59 PM     Author: Vanessa Langford CMA Service: -- Author Type: Certified Medical Assistant    Filed: 3/13/2019 11:12 AM Date of Service: 3/12/2019 11:59 PM Status: Signed    : Vanessa Langford CMA (Certified Medical Assistant)    Encounter addended by: Vanessa Langford CMA on: 3/13/2019 11:12 AM      Actions taken: Charge Capture section accepted

## 2021-07-03 NOTE — ADDENDUM NOTE
Addendum Note by Vanessa Langford CMA at 5/6/2019 10:23 AM     Author: Vanessa Langford CMA Service: -- Author Type: Certified Medical Assistant    Filed: 5/6/2019 10:23 AM Date of Service: 5/6/2019 10:23 AM Status: Signed    : Vanessa Langford CMA (Certified Medical Assistant)    Encounter addended by: Vanessa Langford CMA on: 5/6/2019 10:23 AM      Actions taken: Charge Capture section accepted

## 2021-10-11 ENCOUNTER — HEALTH MAINTENANCE LETTER (OUTPATIENT)
Age: 51
End: 2021-10-11

## 2022-07-17 ENCOUNTER — HEALTH MAINTENANCE LETTER (OUTPATIENT)
Age: 52
End: 2022-07-17

## 2022-09-25 ENCOUNTER — HEALTH MAINTENANCE LETTER (OUTPATIENT)
Age: 52
End: 2022-09-25

## 2023-08-05 ENCOUNTER — HEALTH MAINTENANCE LETTER (OUTPATIENT)
Age: 53
End: 2023-08-05

## 2024-08-25 ENCOUNTER — NURSE TRIAGE (OUTPATIENT)
Dept: NURSING | Facility: CLINIC | Age: 54
End: 2024-08-25

## 2024-08-25 NOTE — TELEPHONE ENCOUNTER
Patient is having dizziness. Patient woke up very lightheaded. Symptoms have been for the last 3 weeks on and off. Today it is more severe needing to hold onto the wall. The dizziness is described as the room spinning. If the patient tips her head back to the left side she gets the spinning, if she puts it back to the right side she doesn't. Patient doesn't feel like herself, she feels off, like she is not safe for driving.   Protocol recommends see PCP within 24 hours  Care advice given. Offered scheduling or UC locations and hours. Patient states she currently does not have insurance and [facility] is not open right now. Patient electing to wait until after speaking with [facility] tomorrow.   Patient does agree to calling back with worsening symptoms.  Zhanna Etienne RN   08/25/24 1:54 PM  [facility] Nurse Advisor      Reason for Disposition   [1] MODERATE dizziness (e.g., vertigo; feels very unsteady, interferes with normal activities) AND [2] has NOT been evaluated by doctor (or NP/PA) for this    Additional Information   Negative: [1] Weakness (i.e., paralysis, loss of muscle strength) of the face, arm or leg on one side of the body AND [2] sudden onset AND [3] present now   Negative: [1] Numbness (i.e., loss of sensation) of the face, arm or leg on one side of the body AND [2] sudden onset AND [3] present now   Negative: [1] Loss of speech or garbled speech AND [2] sudden onset AND [3] present now   Negative: Difficult to awaken or acting confused (e.g., disoriented, slurred speech)   Negative: Sounds like a life-threatening emergency to the triager   Negative: Followed a head injury   Negative: Followed an ear injury   Negative: Localized weakness or numbness is main symptom   Negative: Dizziness relates to riding in a car, going to an amusement park, etc.   Negative: [1] Dizziness is main symptom AND [2] NO spinning sensation (i.e., vertigo)   Negative: SEVERE dizziness (vertigo) (e.g., unable to walk without  assistance)   Negative: Severe headache (e.g., excruciating)   (Exception: Similar to previous migraines.)   Negative: [1] Dizziness (vertigo) present now AND [2] one or more STROKE RISK FACTORS (i.e., hypertension, diabetes, prior stroke/TIA, heart attack)  (Exception: Prior doctor or NP/PA evaluation for this AND no different/worse than usual.)   Negative: [1] Dizziness (vertigo) present now AND [2] age > 59   (Exception: Prior doctor or NP/PA evaluation for this AND no different/worse than usual.)   Negative: [1] Weakness (i.e., paralysis, loss of muscle strength) of the face, arm / hand, or leg / foot on one side of the body AND [2] sudden onset AND [3] brief (now gone)   Negative: [1] Numbness (i.e., loss of sensation) of the face, arm / hand, or leg / foot on one side of the body AND [2] sudden onset AND [3] brief (now gone)   Negative: [1] Loss of speech or garbled speech AND [2] sudden onset AND [3] brief (now gone)   Negative: Loss of vision or double vision  (Exception: Similar to previous migraines.)   Negative: Patient sounds very sick or weak to the triager   Negative: Taking a medicine that could cause dizziness (e.g., phenytoin [Dilantin], carbamazepine [Tegretol], primidone [Mysoline])    Protocols used: Dizziness - Vertigo-A-